# Patient Record
Sex: MALE | Race: OTHER
[De-identification: names, ages, dates, MRNs, and addresses within clinical notes are randomized per-mention and may not be internally consistent; named-entity substitution may affect disease eponyms.]

---

## 2019-03-18 ENCOUNTER — HOSPITAL ENCOUNTER (INPATIENT)
Dept: HOSPITAL 54 - ER | Age: 64
LOS: 4 days | Discharge: NURSING, REHAB OR LONG-TERM CARE | DRG: 137 | End: 2019-03-22
Attending: NURSE PRACTITIONER | Admitting: NURSE PRACTITIONER
Payer: COMMERCIAL

## 2019-03-18 VITALS — BODY MASS INDEX: 32.49 KG/M2 | HEIGHT: 67 IN | WEIGHT: 207 LBS

## 2019-03-18 DIAGNOSIS — R53.2: ICD-10-CM

## 2019-03-18 DIAGNOSIS — N17.9: ICD-10-CM

## 2019-03-18 DIAGNOSIS — E43: ICD-10-CM

## 2019-03-18 DIAGNOSIS — E87.6: ICD-10-CM

## 2019-03-18 DIAGNOSIS — D64.9: ICD-10-CM

## 2019-03-18 DIAGNOSIS — J40: ICD-10-CM

## 2019-03-18 DIAGNOSIS — M48.55XA: ICD-10-CM

## 2019-03-18 DIAGNOSIS — K76.0: ICD-10-CM

## 2019-03-18 DIAGNOSIS — G92: ICD-10-CM

## 2019-03-18 DIAGNOSIS — E78.5: ICD-10-CM

## 2019-03-18 DIAGNOSIS — E11.10: ICD-10-CM

## 2019-03-18 DIAGNOSIS — I50.9: ICD-10-CM

## 2019-03-18 DIAGNOSIS — E83.52: ICD-10-CM

## 2019-03-18 DIAGNOSIS — K56.41: ICD-10-CM

## 2019-03-18 DIAGNOSIS — F03.90: ICD-10-CM

## 2019-03-18 DIAGNOSIS — B37.0: ICD-10-CM

## 2019-03-18 DIAGNOSIS — E11.22: ICD-10-CM
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
## 2019-03-18 DIAGNOSIS — J15.6: Primary | ICD-10-CM

## 2019-03-18 DIAGNOSIS — N18.9: ICD-10-CM

## 2019-03-18 DIAGNOSIS — G40.909: ICD-10-CM

## 2019-03-18 DIAGNOSIS — E87.0: ICD-10-CM

## 2019-03-18 DIAGNOSIS — I69.354: ICD-10-CM

## 2019-03-18 DIAGNOSIS — K80.20: ICD-10-CM

## 2019-03-18 DIAGNOSIS — E87.3: ICD-10-CM

## 2019-03-18 DIAGNOSIS — J15.9: ICD-10-CM

## 2019-03-18 DIAGNOSIS — E83.39: ICD-10-CM

## 2019-03-18 DIAGNOSIS — D68.59: ICD-10-CM

## 2019-03-18 PROCEDURE — C1751 CATH, INF, PER/CENT/MIDLINE: HCPCS

## 2019-03-18 PROCEDURE — G0378 HOSPITAL OBSERVATION PER HR: HCPCS

## 2019-03-18 PROCEDURE — C9113 INJ PANTOPRAZOLE SODIUM, VIA: HCPCS

## 2019-03-18 NOTE — NUR
TO BED 2 [facility] PRIVATE AMBULANCE C/O FEVER AND CONGESTION. PT AWAKE, LETHARGIC, 
RESPONDS TO VOICE BUT FALLS BACK ASLEEP. PT CONFUSED, NO ACUTE DISTRESS NOTED, 
RESP EVEN AND UNLABORED. PLACE PT ON CARDIAC MONITORING, CONTINUOUS POX, 
O2@2L/NC. PENDING ER MD CAMPOVERDE.

## 2019-03-19 VITALS — SYSTOLIC BLOOD PRESSURE: 147 MMHG | DIASTOLIC BLOOD PRESSURE: 81 MMHG

## 2019-03-19 VITALS — DIASTOLIC BLOOD PRESSURE: 75 MMHG | SYSTOLIC BLOOD PRESSURE: 128 MMHG

## 2019-03-19 VITALS — SYSTOLIC BLOOD PRESSURE: 127 MMHG | DIASTOLIC BLOOD PRESSURE: 75 MMHG

## 2019-03-19 VITALS — DIASTOLIC BLOOD PRESSURE: 89 MMHG | SYSTOLIC BLOOD PRESSURE: 129 MMHG

## 2019-03-19 VITALS — DIASTOLIC BLOOD PRESSURE: 80 MMHG | SYSTOLIC BLOOD PRESSURE: 133 MMHG

## 2019-03-19 VITALS — SYSTOLIC BLOOD PRESSURE: 129 MMHG | DIASTOLIC BLOOD PRESSURE: 75 MMHG

## 2019-03-19 VITALS — DIASTOLIC BLOOD PRESSURE: 89 MMHG | SYSTOLIC BLOOD PRESSURE: 149 MMHG

## 2019-03-19 VITALS — SYSTOLIC BLOOD PRESSURE: 131 MMHG | DIASTOLIC BLOOD PRESSURE: 98 MMHG

## 2019-03-19 VITALS — DIASTOLIC BLOOD PRESSURE: 68 MMHG | SYSTOLIC BLOOD PRESSURE: 132 MMHG

## 2019-03-19 VITALS — DIASTOLIC BLOOD PRESSURE: 82 MMHG | SYSTOLIC BLOOD PRESSURE: 157 MMHG

## 2019-03-19 VITALS — DIASTOLIC BLOOD PRESSURE: 84 MMHG | SYSTOLIC BLOOD PRESSURE: 142 MMHG

## 2019-03-19 VITALS — DIASTOLIC BLOOD PRESSURE: 98 MMHG | SYSTOLIC BLOOD PRESSURE: 141 MMHG

## 2019-03-19 VITALS — DIASTOLIC BLOOD PRESSURE: 78 MMHG | SYSTOLIC BLOOD PRESSURE: 132 MMHG

## 2019-03-19 VITALS — DIASTOLIC BLOOD PRESSURE: 82 MMHG | SYSTOLIC BLOOD PRESSURE: 150 MMHG

## 2019-03-19 VITALS — SYSTOLIC BLOOD PRESSURE: 149 MMHG | DIASTOLIC BLOOD PRESSURE: 89 MMHG

## 2019-03-19 VITALS — SYSTOLIC BLOOD PRESSURE: 141 MMHG | DIASTOLIC BLOOD PRESSURE: 75 MMHG

## 2019-03-19 VITALS — SYSTOLIC BLOOD PRESSURE: 140 MMHG | DIASTOLIC BLOOD PRESSURE: 77 MMHG

## 2019-03-19 VITALS — DIASTOLIC BLOOD PRESSURE: 82 MMHG | SYSTOLIC BLOOD PRESSURE: 139 MMHG

## 2019-03-19 VITALS — DIASTOLIC BLOOD PRESSURE: 78 MMHG | SYSTOLIC BLOOD PRESSURE: 145 MMHG

## 2019-03-19 VITALS — DIASTOLIC BLOOD PRESSURE: 76 MMHG | SYSTOLIC BLOOD PRESSURE: 125 MMHG

## 2019-03-19 VITALS — SYSTOLIC BLOOD PRESSURE: 131 MMHG | DIASTOLIC BLOOD PRESSURE: 82 MMHG

## 2019-03-19 VITALS — DIASTOLIC BLOOD PRESSURE: 97 MMHG | SYSTOLIC BLOOD PRESSURE: 140 MMHG

## 2019-03-19 LAB
ALBUMIN SERPL BCP-MCNC: 3 G/DL (ref 3.4–5)
ALBUMIN SERPL BCP-MCNC: 3.7 G/DL (ref 3.4–5)
ALP SERPL-CCNC: 138 U/L (ref 46–116)
ALP SERPL-CCNC: 173 U/L (ref 46–116)
ALT SERPL W P-5'-P-CCNC: 45 U/L (ref 12–78)
ALT SERPL W P-5'-P-CCNC: 56 U/L (ref 12–78)
APPEARANCE UR: (no result)
AST SERPL W P-5'-P-CCNC: 20 U/L (ref 15–37)
AST SERPL W P-5'-P-CCNC: 21 U/L (ref 15–37)
BASOPHILS # BLD AUTO: 0.1 /CMM (ref 0–0.2)
BASOPHILS NFR BLD AUTO: 0.7 % (ref 0–2)
BILIRUB DIRECT SERPL-MCNC: 0.2 MG/DL (ref 0–0.2)
BILIRUB DIRECT SERPL-MCNC: 0.2 MG/DL (ref 0–0.2)
BILIRUB SERPL-MCNC: 0.6 MG/DL (ref 0.2–1)
BILIRUB SERPL-MCNC: 0.7 MG/DL (ref 0.2–1)
BILIRUB UR QL STRIP: (no result)
BILIRUB UR QL STRIP: NEGATIVE
BUN SERPL-MCNC: 18 MG/DL (ref 7–18)
BUN SERPL-MCNC: 22 MG/DL (ref 7–18)
BUN SERPL-MCNC: 35 MG/DL (ref 7–18)
BUN SERPL-MCNC: 40 MG/DL (ref 7–18)
CALCIUM SERPL-MCNC: 10 MG/DL (ref 8.5–10.1)
CALCIUM SERPL-MCNC: 7.6 MG/DL (ref 8.5–10.1)
CALCIUM SERPL-MCNC: 7.9 MG/DL (ref 8.5–10.1)
CALCIUM SERPL-MCNC: 8.5 MG/DL (ref 8.5–10.1)
CHLORIDE SERPL-SCNC: 114 MMOL/L (ref 98–107)
CHLORIDE SERPL-SCNC: 124 MMOL/L (ref 98–107)
CHLORIDE SERPL-SCNC: 127 MMOL/L (ref 98–107)
CHLORIDE SERPL-SCNC: 128 MMOL/L (ref 98–107)
CO2 SERPL-SCNC: 18 MMOL/L (ref 21–32)
CO2 SERPL-SCNC: 20 MMOL/L (ref 21–32)
CO2 SERPL-SCNC: 23 MMOL/L (ref 21–32)
CO2 SERPL-SCNC: 23 MMOL/L (ref 21–32)
COLOR UR: YELLOW
CREAT SERPL-MCNC: 1.5 MG/DL (ref 0.6–1.3)
CREAT SERPL-MCNC: 1.6 MG/DL (ref 0.6–1.3)
CREAT SERPL-MCNC: 2.3 MG/DL (ref 0.6–1.3)
CREAT SERPL-MCNC: 2.4 MG/DL (ref 0.6–1.3)
CREAT UR-MCNC: 74.3 MG/DL (ref 30–125)
EOSINOPHIL NFR BLD AUTO: 0.1 % (ref 0–6)
GLUCOSE SERPL-MCNC: 285 MG/DL (ref 74–106)
GLUCOSE SERPL-MCNC: 291 MG/DL (ref 74–106)
GLUCOSE SERPL-MCNC: 468 MG/DL (ref 74–106)
GLUCOSE SERPL-MCNC: 615 MG/DL (ref 74–106)
GLUCOSE UR STRIP-MCNC: (no result) MG/DL
GLUCOSE UR STRIP-MCNC: NEGATIVE MG/DL
HCT VFR BLD AUTO: 59 % (ref 39–51)
HGB BLD-MCNC: 18.8 G/DL (ref 13.5–17.5)
HGB UR QL STRIP: (no result) ERY/UL
KETONES UR STRIP-MCNC: (no result) MG/DL
KETONES UR STRIP-MCNC: 80 MG/DL
LEUKOCYTE ESTERASE UR QL STRIP: NEGATIVE
LYMPHOCYTES NFR BLD AUTO: 1.6 /CMM (ref 0.8–4.8)
LYMPHOCYTES NFR BLD AUTO: 9.2 % (ref 20–44)
LYMPHOCYTES NFR BLD MANUAL: 7 % (ref 16–48)
MAGNESIUM SERPL-MCNC: 1.7 MG/DL (ref 1.8–2.4)
MAGNESIUM SERPL-MCNC: 2 MG/DL (ref 1.8–2.4)
MAGNESIUM SERPL-MCNC: 2.4 MG/DL (ref 1.8–2.4)
MCHC RBC AUTO-ENTMCNC: 32 G/DL (ref 31–36)
MCV RBC AUTO: 93 FL (ref 80–96)
MONOCYTES NFR BLD AUTO: 1.3 /CMM (ref 0.1–1.3)
MONOCYTES NFR BLD AUTO: 7.6 % (ref 2–12)
MONOCYTES NFR BLD MANUAL: 4 % (ref 0–11)
NEUTROPHILS # BLD AUTO: 14.5 /CMM (ref 1.8–8.9)
NEUTROPHILS NFR BLD AUTO: 82.4 % (ref 43–81)
NEUTS SEG NFR BLD MANUAL: 89 % (ref 42–76)
NITRITE UR QL STRIP: NEGATIVE
NT-PROBNP SERPL-MCNC: 256 PG/ML (ref 0–125)
PH UR STRIP: 5 [PH] (ref 5–8)
PH UR STRIP: 5.5 [PH] (ref 5–8)
PHOSPHATE SERPL-MCNC: 1.6 MG/DL (ref 2.5–4.9)
PHOSPHATE SERPL-MCNC: 1.9 MG/DL (ref 2.5–4.9)
PHOSPHATE SERPL-MCNC: 3.5 MG/DL (ref 2.5–4.9)
PLATELET # BLD AUTO: 248 /CMM (ref 150–450)
POTASSIUM SERPL-SCNC: 3 MMOL/L (ref 3.5–5.1)
POTASSIUM SERPL-SCNC: 3.1 MMOL/L (ref 3.5–5.1)
POTASSIUM SERPL-SCNC: 4 MMOL/L (ref 3.5–5.1)
POTASSIUM SERPL-SCNC: 4.1 MMOL/L (ref 3.5–5.1)
PROT SERPL-MCNC: 7.6 G/DL (ref 6.4–8.2)
PROT SERPL-MCNC: 9.2 G/DL (ref 6.4–8.2)
PROT UR QL STRIP: (no result) MG/DL
PROT UR QL STRIP: 100 MG/DL
PROT UR-MCNC: 52.3 MG/DL (ref 0–11.9)
RBC # BLD AUTO: 6.29 MIL/UL (ref 4.5–6)
RBC #/AREA URNS HPF: (no result) /HPF (ref 0–2)
RBC #/AREA URNS HPF: (no result) /HPF (ref 0–2)
SODIUM SERPL-SCNC: 155 MMOL/L (ref 136–145)
SODIUM SERPL-SCNC: 161 MMOL/L (ref 136–145)
SODIUM SERPL-SCNC: 161 MMOL/L (ref 136–145)
SODIUM SERPL-SCNC: 162 MMOL/L (ref 136–145)
SODIUM UR-SCNC: 42 MMOL/L (ref 40–220)
UROBILINOGEN UR STRIP-MCNC: 0.2 EU/DL
UROBILINOGEN UR STRIP-MCNC: 0.2 EU/DL
WBC #/AREA URNS HPF: (no result) /HPF (ref 0–3)
WBC #/AREA URNS HPF: (no result) /HPF (ref 0–3)
WBC NRBC COR # BLD AUTO: 17.6 K/UL (ref 4.3–11)

## 2019-03-19 PROCEDURE — B548ZZA ULTRASONOGRAPHY OF SUPERIOR VENA CAVA, GUIDANCE: ICD-10-PCS | Performed by: NURSE PRACTITIONER

## 2019-03-19 PROCEDURE — 02HV33Z INSERTION OF INFUSION DEVICE INTO SUPERIOR VENA CAVA, PERCUTANEOUS APPROACH: ICD-10-PCS | Performed by: NURSE PRACTITIONER

## 2019-03-19 RX ADMIN — DOCUSATE SODIUM SCH MG: 250 CAPSULE, LIQUID FILLED ORAL at 09:00

## 2019-03-19 RX ADMIN — Medication SCH EACH: at 13:32

## 2019-03-19 RX ADMIN — Medication SCH EACH: at 21:40

## 2019-03-19 RX ADMIN — LACOSAMIDE SCH MG: 50 TABLET, FILM COATED ORAL at 21:00

## 2019-03-19 RX ADMIN — PIPERACILLIN SODIUM AND TAZOBACTAM SODIUM SCH MLS/HR: .375; 3 INJECTION, POWDER, LYOPHILIZED, FOR SOLUTION INTRAVENOUS at 09:05

## 2019-03-19 RX ADMIN — ASPIRIN 81 MG SCH MG: 81 TABLET ORAL at 12:00

## 2019-03-19 RX ADMIN — GABAPENTIN SCH MG: 300 CAPSULE ORAL at 17:00

## 2019-03-19 RX ADMIN — Medication SCH EACH: at 09:14

## 2019-03-19 RX ADMIN — SODIUM CHLORIDE SCH MLS/HR: 9 INJECTION, SOLUTION INTRAVENOUS at 17:39

## 2019-03-19 RX ADMIN — HEPARIN SODIUM SCH UNITS: 5000 INJECTION INTRAVENOUS; SUBCUTANEOUS at 12:24

## 2019-03-19 RX ADMIN — Medication SCH EACH: at 12:26

## 2019-03-19 RX ADMIN — PIPERACILLIN SODIUM AND TAZOBACTAM SODIUM SCH MLS/HR: .375; 3 INJECTION, POWDER, LYOPHILIZED, FOR SOLUTION INTRAVENOUS at 12:24

## 2019-03-19 RX ADMIN — SODIUM CHLORIDE SCH MLS/HR: 9 INJECTION, SOLUTION INTRAVENOUS at 20:31

## 2019-03-19 RX ADMIN — Medication SCH EACH: at 19:22

## 2019-03-19 RX ADMIN — SODIUM CHLORIDE SCH MG: 9 INJECTION, SOLUTION INTRAVENOUS at 09:00

## 2019-03-19 RX ADMIN — HUMAN INSULIN PRN MLS/HR: 100 INJECTION, SOLUTION SUBCUTANEOUS at 17:52

## 2019-03-19 RX ADMIN — SODIUM CHLORIDE SCH MLS/HR: 9 INJECTION, SOLUTION INTRAVENOUS at 12:44

## 2019-03-19 RX ADMIN — Medication SCH EACH: at 15:08

## 2019-03-19 RX ADMIN — Medication SCH EACH: at 14:26

## 2019-03-19 RX ADMIN — Medication SCH EACH: at 11:19

## 2019-03-19 RX ADMIN — Medication SCH EACH: at 16:14

## 2019-03-19 RX ADMIN — Medication SCH UNIT: at 20:28

## 2019-03-19 RX ADMIN — HUMAN INSULIN PRN MLS/HR: 100 INJECTION, SOLUTION SUBCUTANEOUS at 07:13

## 2019-03-19 RX ADMIN — SODIUM CHLORIDE SCH MLS/HR: 9 INJECTION, SOLUTION INTRAVENOUS at 20:28

## 2019-03-19 RX ADMIN — Medication SCH EACH: at 10:33

## 2019-03-19 RX ADMIN — Medication SCH EACH: at 17:19

## 2019-03-19 RX ADMIN — HEPARIN SODIUM SCH UNITS: 5000 INJECTION INTRAVENOUS; SUBCUTANEOUS at 20:29

## 2019-03-19 RX ADMIN — PIPERACILLIN SODIUM AND TAZOBACTAM SODIUM SCH MLS/HR: .375; 3 INJECTION, POWDER, LYOPHILIZED, FOR SOLUTION INTRAVENOUS at 17:12

## 2019-03-19 RX ADMIN — GABAPENTIN SCH MG: 300 CAPSULE ORAL at 13:00

## 2019-03-19 NOTE — NUR
RN INITIAL NOTES



RECEIVED PT A/OX1, DROWSY. ON 02 AT 2LPM VIA NC. HOB ELEVATED. NO SOB NOTED. NO SIGNS OF 
PAIN NOTED. IV LINES IN PLACE. IVF INFUSING. PT ON INSULIN DRIP, ACCUCHECK Q1. BMP AT 1000. 
SKIN INTACT. BLE ELEVATED. CALL LIGHT WITHIN REACH. WILL CLOSELY MONITOR.

## 2019-03-19 NOTE — NUR
RN CLOSING NOTES



NO SIGNIFICANT CHANGE NOTED. NO CHANGE IN LOC NOTED. PT REMAINS ON INSULIN DRIP AT 5.08 
U/HR. NEXT ACCUCHECK AT 1900. LUPE PICC IN PLACE. IVF INFUSING. FC IN PLACE. KEPT CLEAN AND 
DRY. REPOSITIONED Q2. BLE ELEVATED. ALL NEEDS ATTENDED AND MET. CALL LIGHT WITHIN REACH. 
WILL MONITOR.

## 2019-03-19 NOTE — NUR
RN NOTES



SEEN AND EXAMINED BY DR ALMONTE. REVIEWED H&P, CURRENT MEDS AND LAB VALUES. PT A/OX1, 
CONFUSED. ABLE TO FOLLOW COMMANDS AND ANSER SIMPLE QUESTIONS. NO CHANGE IN LOC NOTED. WILL 
CONTINUE TO MONITOR.

## 2019-03-19 NOTE — NUR
RN NOTES



1125 SEEN AND EXAMINED BY JESSIE RODGERS NP. MD AWARE OF LAB VALUES: WBC 17.6, HGB 18.8, HCT 
59, PLATELET 248. SODIUM 161, BUN 35, CREA 2.3, GLUCOSE 468. PT ON INSULIN DRIP. PT HAS 
LACTIC AND BMP DUE. UNABLE TO DRAW, PT HARD STICK, 2 PHLEBOTOMISTS TRIED 3X. PT FOR PICC 
INSERTION. JESSIE OK TO DO BMP AND LACTIC DRAW ONCE PICC LINE IS INSERTED. AWAITING US KIDNEY 
RESULT. ORDERED NEURO CONSULT WITH DR ALMONTE. IVF CHANGED TO 1/2NS AT 125ML/HR. 



1200 PT NOTED COUGHING TOO MUCH. UNABLE TO GIVE PO MEDS D/T HIGH RISK OF ASPIRATION. PT 
A/OX1, CONFUSED. HOB ELEVATED. PT FOR SWALLOW EVAL. WILL CONTINUE TO MONITOR

## 2019-03-19 NOTE — NUR
RN NOTES



1500 PICC LINE INSERTED ON LUPE. PLACEMENT CONFIRMED BY CXR. CALLED LAB FOR LACTIC AND BMP 
DRAW. WILL NOTIFY REID ROMAN FOR RESULTS

## 2019-03-19 NOTE — NUR
RN ICU - NOTES - ADMISSION - RECEIVED PT FROM ER FOR DKA, PT IS AWAKE ALERT, Burmese 
SPEAKING, PT HAS LEFT SIDED WEAKNESS, 18G IV IN LEFT THUMB, AND 20G IV IN LEFT HAND. PT IS 
ON 2L NC O2SAT 94%. PT IS IN SR W BBB, BP 130S. SKIN IS INTACT. WILL CONTINUE TO MONITOR

## 2019-03-19 NOTE — NUR
RN NOTES



CALLED JESSIE RODGERS NP REGARDING BMP RESULT. SODIUM 162, POTASSIUM 3.1, CHLORIDE 128, BUN 22, 
CREA 1.6, GLUCOSE 285. ON INSULIN DRIP AT 4.9U/HR. ON 1/2NS AT 125ML/HR. PHOS 1.9, MAGNESIUM 
1.7. NP ORDERED CHANGE TO D5 1/2 NS WITH KCL 40MEQ AT 125ML/HR, CHANGE ACCUCHECK TO Q2, 
KPHOS X2 BAGS AND MAGNESIUM 1GM. AWAITING FOR LACTIC ACID RESULT. WILL CONTINUE TO MONITOR.

## 2019-03-20 VITALS — SYSTOLIC BLOOD PRESSURE: 128 MMHG | DIASTOLIC BLOOD PRESSURE: 78 MMHG

## 2019-03-20 VITALS — SYSTOLIC BLOOD PRESSURE: 144 MMHG | DIASTOLIC BLOOD PRESSURE: 75 MMHG

## 2019-03-20 VITALS — SYSTOLIC BLOOD PRESSURE: 119 MMHG | DIASTOLIC BLOOD PRESSURE: 72 MMHG

## 2019-03-20 VITALS — SYSTOLIC BLOOD PRESSURE: 123 MMHG | DIASTOLIC BLOOD PRESSURE: 72 MMHG

## 2019-03-20 VITALS — DIASTOLIC BLOOD PRESSURE: 77 MMHG | SYSTOLIC BLOOD PRESSURE: 129 MMHG

## 2019-03-20 VITALS — SYSTOLIC BLOOD PRESSURE: 128 MMHG | DIASTOLIC BLOOD PRESSURE: 82 MMHG

## 2019-03-20 VITALS — DIASTOLIC BLOOD PRESSURE: 77 MMHG | SYSTOLIC BLOOD PRESSURE: 125 MMHG

## 2019-03-20 VITALS — DIASTOLIC BLOOD PRESSURE: 72 MMHG | SYSTOLIC BLOOD PRESSURE: 120 MMHG

## 2019-03-20 VITALS — SYSTOLIC BLOOD PRESSURE: 122 MMHG | DIASTOLIC BLOOD PRESSURE: 74 MMHG

## 2019-03-20 VITALS — SYSTOLIC BLOOD PRESSURE: 126 MMHG | DIASTOLIC BLOOD PRESSURE: 77 MMHG

## 2019-03-20 VITALS — DIASTOLIC BLOOD PRESSURE: 75 MMHG | SYSTOLIC BLOOD PRESSURE: 127 MMHG

## 2019-03-20 VITALS — DIASTOLIC BLOOD PRESSURE: 73 MMHG | SYSTOLIC BLOOD PRESSURE: 127 MMHG

## 2019-03-20 VITALS — SYSTOLIC BLOOD PRESSURE: 128 MMHG | DIASTOLIC BLOOD PRESSURE: 76 MMHG

## 2019-03-20 VITALS — SYSTOLIC BLOOD PRESSURE: 132 MMHG | DIASTOLIC BLOOD PRESSURE: 81 MMHG

## 2019-03-20 VITALS — DIASTOLIC BLOOD PRESSURE: 78 MMHG | SYSTOLIC BLOOD PRESSURE: 130 MMHG

## 2019-03-20 LAB
ALBUMIN SERPL BCP-MCNC: 2.3 G/DL (ref 3.4–5)
ALP SERPL-CCNC: 103 U/L (ref 46–116)
ALT SERPL W P-5'-P-CCNC: 44 U/L (ref 12–78)
AST SERPL W P-5'-P-CCNC: 28 U/L (ref 15–37)
BASE EXCESS BLDA CALC-SCNC: -4.7 MMOL/L
BASOPHILS # BLD AUTO: 0 /CMM (ref 0–0.2)
BASOPHILS NFR BLD AUTO: 0.4 % (ref 0–2)
BILIRUB SERPL-MCNC: 0.4 MG/DL (ref 0.2–1)
BUN SERPL-MCNC: 14 MG/DL (ref 7–18)
BUN SERPL-MCNC: 15 MG/DL (ref 7–18)
CALCIUM SERPL-MCNC: 7.6 MG/DL (ref 8.5–10.1)
CALCIUM SERPL-MCNC: 8.1 MG/DL (ref 8.5–10.1)
CHLORIDE SERPL-SCNC: 126 MMOL/L (ref 98–107)
CHLORIDE SERPL-SCNC: 127 MMOL/L (ref 98–107)
CO2 SERPL-SCNC: 21 MMOL/L (ref 21–32)
CO2 SERPL-SCNC: 23 MMOL/L (ref 21–32)
CREAT SERPL-MCNC: 1.5 MG/DL (ref 0.6–1.3)
CREAT SERPL-MCNC: 1.6 MG/DL (ref 0.6–1.3)
DO-HGB MFR BLDA: 86.9 MMHG
EOSINOPHIL NFR BLD AUTO: 3.3 % (ref 0–6)
GLUCOSE SERPL-MCNC: 217 MG/DL (ref 74–106)
GLUCOSE SERPL-MCNC: 275 MG/DL (ref 74–106)
HCT VFR BLD AUTO: 52 % (ref 39–51)
HGB BLD-MCNC: 16.5 G/DL (ref 13.5–17.5)
INHALED O2 CONCENTRATION: 28 %
INHALED O2 FLOW RATE: 2 L/MIN (ref 0–30)
LYMPHOCYTES NFR BLD AUTO: 0.5 /CMM (ref 0.8–4.8)
LYMPHOCYTES NFR BLD AUTO: 7.6 % (ref 20–44)
MAGNESIUM SERPL-MCNC: 1.6 MG/DL (ref 1.8–2.4)
MAGNESIUM SERPL-MCNC: 1.9 MG/DL (ref 1.8–2.4)
MCHC RBC AUTO-ENTMCNC: 32 G/DL (ref 31–36)
MCV RBC AUTO: 94 FL (ref 80–96)
MONOCYTES NFR BLD AUTO: 0.5 /CMM (ref 0.1–1.3)
MONOCYTES NFR BLD AUTO: 7.4 % (ref 2–12)
NEUTROPHILS # BLD AUTO: 5.6 /CMM (ref 1.8–8.9)
NEUTROPHILS NFR BLD AUTO: 81.3 % (ref 43–81)
PCO2 TEMP ADJ BLDA: 36 MMHG (ref 35–45)
PH TEMP ADJ BLDA: 7.36 [PH] (ref 7.35–7.45)
PHOSPHATE SERPL-MCNC: 1.7 MG/DL (ref 2.5–4.9)
PHOSPHATE SERPL-MCNC: 2 MG/DL (ref 2.5–4.9)
PLATELET # BLD AUTO: 154 /CMM (ref 150–450)
PO2 TEMP ADJ BLDA: 70.3 MMHG (ref 75–100)
POTASSIUM SERPL-SCNC: 3.1 MMOL/L (ref 3.5–5.1)
POTASSIUM SERPL-SCNC: 3.3 MMOL/L (ref 3.5–5.1)
PROT SERPL-MCNC: 6.2 G/DL (ref 6.4–8.2)
RBC # BLD AUTO: 5.48 MIL/UL (ref 4.5–6)
SAO2 % BLDA: 94.1 % (ref 92–98.5)
SODIUM SERPL-SCNC: 161 MMOL/L (ref 136–145)
SODIUM SERPL-SCNC: 161 MMOL/L (ref 136–145)
WBC NRBC COR # BLD AUTO: 6.9 K/UL (ref 4.3–11)

## 2019-03-20 RX ADMIN — SODIUM CHLORIDE, SODIUM LACTATE, POTASSIUM CHLORIDE, AND CALCIUM CHLORIDE PRN MLS/HR: .6; .31; .03; .02 INJECTION, SOLUTION INTRAVENOUS at 09:12

## 2019-03-20 RX ADMIN — Medication SCH UNIT: at 09:12

## 2019-03-20 RX ADMIN — MAGNESIUM SULFATE IN DEXTROSE SCH MLS/HR: 10 INJECTION, SOLUTION INTRAVENOUS at 09:56

## 2019-03-20 RX ADMIN — SODIUM CHLORIDE SCH MG: 9 INJECTION, SOLUTION INTRAVENOUS at 09:12

## 2019-03-20 RX ADMIN — SODIUM CHLORIDE, SODIUM LACTATE, POTASSIUM CHLORIDE, AND CALCIUM CHLORIDE PRN MLS/HR: .6; .31; .03; .02 INJECTION, SOLUTION INTRAVENOUS at 01:16

## 2019-03-20 RX ADMIN — PIPERACILLIN SODIUM AND TAZOBACTAM SODIUM SCH MLS/HR: .375; 3 INJECTION, POWDER, LYOPHILIZED, FOR SOLUTION INTRAVENOUS at 05:50

## 2019-03-20 RX ADMIN — INSULIN HUMAN PRN UNIT: 100 INJECTION, SOLUTION PARENTERAL at 17:50

## 2019-03-20 RX ADMIN — Medication SCH EACH: at 05:09

## 2019-03-20 RX ADMIN — HEPARIN SODIUM SCH UNITS: 5000 INJECTION INTRAVENOUS; SUBCUTANEOUS at 21:32

## 2019-03-20 RX ADMIN — SODIUM CHLORIDE SCH MLS/HR: 9 INJECTION, SOLUTION INTRAVENOUS at 21:30

## 2019-03-20 RX ADMIN — Medication SCH UNIT: at 13:00

## 2019-03-20 RX ADMIN — Medication SCH EACH: at 23:15

## 2019-03-20 RX ADMIN — GABAPENTIN SCH MG: 300 CAPSULE ORAL at 13:00

## 2019-03-20 RX ADMIN — SODIUM CHLORIDE SCH MLS/HR: 9 INJECTION, SOLUTION INTRAVENOUS at 09:12

## 2019-03-20 RX ADMIN — LACOSAMIDE SCH MG: 50 TABLET, FILM COATED ORAL at 08:59

## 2019-03-20 RX ADMIN — INSULIN HUMAN PRN UNIT: 100 INJECTION, SOLUTION PARENTERAL at 11:42

## 2019-03-20 RX ADMIN — SODIUM CHLORIDE SCH MLS/HR: 9 INJECTION, SOLUTION INTRAVENOUS at 12:01

## 2019-03-20 RX ADMIN — PIPERACILLIN SODIUM AND TAZOBACTAM SODIUM SCH MLS/HR: .375; 3 INJECTION, POWDER, LYOPHILIZED, FOR SOLUTION INTRAVENOUS at 11:42

## 2019-03-20 RX ADMIN — Medication SCH EACH: at 00:03

## 2019-03-20 RX ADMIN — Medication SCH EACH: at 17:38

## 2019-03-20 RX ADMIN — POTASSIUM CHLORIDE SCH MLS/HR: 200 INJECTION, SOLUTION INTRAVENOUS at 04:11

## 2019-03-20 RX ADMIN — Medication SCH EACH: at 11:39

## 2019-03-20 RX ADMIN — ASPIRIN 81 MG SCH MG: 81 TABLET ORAL at 08:59

## 2019-03-20 RX ADMIN — HEPARIN SODIUM SCH UNITS: 5000 INJECTION INTRAVENOUS; SUBCUTANEOUS at 09:14

## 2019-03-20 RX ADMIN — GABAPENTIN SCH MG: 300 CAPSULE ORAL at 08:59

## 2019-03-20 RX ADMIN — SODIUM CHLORIDE SCH MLS/HR: 9 INJECTION, SOLUTION INTRAVENOUS at 10:01

## 2019-03-20 RX ADMIN — Medication SCH EACH: at 07:01

## 2019-03-20 RX ADMIN — PIPERACILLIN SODIUM AND TAZOBACTAM SODIUM SCH MLS/HR: .375; 3 INJECTION, POWDER, LYOPHILIZED, FOR SOLUTION INTRAVENOUS at 17:33

## 2019-03-20 RX ADMIN — PIPERACILLIN SODIUM AND TAZOBACTAM SODIUM SCH MLS/HR: .375; 3 INJECTION, POWDER, LYOPHILIZED, FOR SOLUTION INTRAVENOUS at 00:03

## 2019-03-20 RX ADMIN — POTASSIUM CHLORIDE SCH MLS/HR: 200 INJECTION, SOLUTION INTRAVENOUS at 05:23

## 2019-03-20 RX ADMIN — SODIUM CHLORIDE PRN MLS/HR: 4.5 INJECTION, SOLUTION INTRAVENOUS at 09:48

## 2019-03-20 RX ADMIN — PIPERACILLIN SODIUM AND TAZOBACTAM SODIUM SCH MLS/HR: .375; 3 INJECTION, POWDER, LYOPHILIZED, FOR SOLUTION INTRAVENOUS at 23:20

## 2019-03-20 RX ADMIN — POTASSIUM CHLORIDE SCH MLS/HR: 200 INJECTION, SOLUTION INTRAVENOUS at 02:58

## 2019-03-20 RX ADMIN — INSULIN HUMAN PRN UNIT: 100 INJECTION, SOLUTION PARENTERAL at 23:17

## 2019-03-20 RX ADMIN — GABAPENTIN SCH MG: 300 CAPSULE ORAL at 17:29

## 2019-03-20 RX ADMIN — DOCUSATE SODIUM SCH MG: 250 CAPSULE, LIQUID FILLED ORAL at 08:59

## 2019-03-20 RX ADMIN — Medication SCH EACH: at 02:58

## 2019-03-20 RX ADMIN — POTASSIUM CHLORIDE SCH MLS/HR: 200 INJECTION, SOLUTION INTRAVENOUS at 06:20

## 2019-03-20 RX ADMIN — MAGNESIUM SULFATE IN DEXTROSE SCH MLS/HR: 10 INJECTION, SOLUTION INTRAVENOUS at 11:05

## 2019-03-20 RX ADMIN — DEXTROSE MONOHYDRATE SCH MLS/HR: 50 INJECTION, SOLUTION INTRAVENOUS at 06:21

## 2019-03-20 RX ADMIN — SODIUM CHLORIDE, SODIUM LACTATE, POTASSIUM CHLORIDE, AND CALCIUM CHLORIDE PRN MLS/HR: .6; .31; .03; .02 INJECTION, SOLUTION INTRAVENOUS at 01:15

## 2019-03-20 RX ADMIN — INSULIN GLARGINE SCH UNIT: 100 INJECTION, SOLUTION SUBCUTANEOUS at 21:33

## 2019-03-20 RX ADMIN — Medication SCH UNIT: at 17:29

## 2019-03-20 RX ADMIN — Medication SCH EACH: at 00:58

## 2019-03-20 RX ADMIN — LACOSAMIDE SCH MG: 50 TABLET, FILM COATED ORAL at 21:30

## 2019-03-20 NOTE — NUR
RN INITIAL NOTES



RECEIVED THE PATIENT SLEEPING ON BED. EASILY AROUSABLE, PT IS A/O X1 ONLY. ON 2L NASAL 
CANNULA, SATURATING WELL, NO S/S OF RESP DISTRESS. PT IS SR ON THE MONITOR, HR 70-80'S. LEFT 
NARE NGT IS CLAMPED, PLACEMENT VERIFIED VIA AUSCULTATION. BEE CATH INTACT. RIGHT UPPER ARM 
PICC WITH 1/2NS @ 75MLS/HR, FLUSHED AND PATENT, NO S/S OF INFILTRATION/INFECTION, DRESSING 
CDI. RIGHT ARM WRIST RESTRAINT IN PLACE FOR SAFETY. BED LOW AND LOCKED, SIDERAILS UP, CALL 
LIGHT WITHIN REACH. WILL MONITOR

## 2019-03-20 NOTE — NUR
RN NOTE

1200 RECEIVED PT FROM REBEKA, ICU RN, IN STABLE CONDITION. WILL CARRY OUT ORDERS, PENDING 
DIETARY CONSULT. ON NC 2 L/MIN, SATURATION 97%, NO SOB. R WRIST RESTRAINT IN PLACE, PT 
ATTEMPTS TO PULL OUT NG TUBE. NG TUBE IN LEFT NARIS SECURED AND IN PLACE, CHECKED FOR 
PLACEMENT. NO TUBE FEEDING YET, PENDING VIDEO SWALLOW TOMORROW. BEE IN PLACE DRAINING 
URINE, PICC LINE INTACT AND PATENT. SAFETY MEASURES IN PLACE, CALL LIGHT WITHIN REACH, WILL 
CONTINUE TO MONITOR.

## 2019-03-20 NOTE — NUR
Pt seen & examined by REID Celestin & Dr. Briggs w/ orders made & carried out. Per NP, once NGT in 
place may place restraint on R arm d/t pulling out of tubes. May order CXR STAT to verify 
NGT placement. 

-------------------------------------------------------------------------------

Addendum: 03/20/19 at 1237 by REBEKA ALBERTS RN

-------------------------------------------------------------------------------

Addendum: Per bela Celestin start GTF c/o RD.

## 2019-03-20 NOTE — NUR
BMP relayed to Fawad MATHEWS. Rec'd call back & ordered to stop insulin drip & start on moderate 
sliding scale q6H, check HGBA1c STAT.

## 2019-03-20 NOTE — NUR
REMAINS CONFUSED,FOLLOWING DKA PROTOCOL AS ORDERED, NO ACUTE DISTRESS NOTED OR 
VOICED.REMAINS IN NSR

## 2019-03-20 NOTE — NUR
ICU RN TRANSFER NOTES:



Pt transferred to [facility] 111/2 via bed, ACLS protocol as ordered. Pt not in any distress. No 
significant changes noted w/in shift & upon transfer. VS stable. LUPE PICC line TLC kept 
patent & intact as well as NGT & FC. All belongings including meds sent w/ pt. Bedside 
report given to LISSETTE Cisneros for TONA.

## 2019-03-21 VITALS — DIASTOLIC BLOOD PRESSURE: 78 MMHG | SYSTOLIC BLOOD PRESSURE: 122 MMHG

## 2019-03-21 VITALS — DIASTOLIC BLOOD PRESSURE: 83 MMHG | SYSTOLIC BLOOD PRESSURE: 149 MMHG

## 2019-03-21 VITALS — DIASTOLIC BLOOD PRESSURE: 68 MMHG | SYSTOLIC BLOOD PRESSURE: 108 MMHG

## 2019-03-21 VITALS — SYSTOLIC BLOOD PRESSURE: 119 MMHG | DIASTOLIC BLOOD PRESSURE: 78 MMHG

## 2019-03-21 VITALS — SYSTOLIC BLOOD PRESSURE: 98 MMHG | DIASTOLIC BLOOD PRESSURE: 65 MMHG

## 2019-03-21 VITALS — DIASTOLIC BLOOD PRESSURE: 70 MMHG | SYSTOLIC BLOOD PRESSURE: 115 MMHG

## 2019-03-21 LAB
BASOPHILS # BLD AUTO: 0 /CMM (ref 0–0.2)
BASOPHILS NFR BLD AUTO: 0.7 % (ref 0–2)
BUN SERPL-MCNC: 12 MG/DL (ref 7–18)
CALCIUM SERPL-MCNC: 7.5 MG/DL (ref 8.5–10.1)
CHLORIDE SERPL-SCNC: 120 MMOL/L (ref 98–107)
CO2 SERPL-SCNC: 25 MMOL/L (ref 21–32)
CREAT SERPL-MCNC: 1.3 MG/DL (ref 0.6–1.3)
EOSINOPHIL NFR BLD AUTO: 4.9 % (ref 0–6)
GLUCOSE SERPL-MCNC: 197 MG/DL (ref 74–106)
HCT VFR BLD AUTO: 43 % (ref 39–51)
HGB BLD-MCNC: 14.1 G/DL (ref 13.5–17.5)
LYMPHOCYTES NFR BLD AUTO: 0.7 /CMM (ref 0.8–4.8)
LYMPHOCYTES NFR BLD AUTO: 14.3 % (ref 20–44)
MCHC RBC AUTO-ENTMCNC: 33 G/DL (ref 31–36)
MCV RBC AUTO: 91 FL (ref 80–96)
MONOCYTES NFR BLD AUTO: 0.4 /CMM (ref 0.1–1.3)
MONOCYTES NFR BLD AUTO: 8.5 % (ref 2–12)
NEUTROPHILS # BLD AUTO: 3.3 /CMM (ref 1.8–8.9)
NEUTROPHILS NFR BLD AUTO: 71.6 % (ref 43–81)
PLATELET # BLD AUTO: 111 /CMM (ref 150–450)
POTASSIUM SERPL-SCNC: 3.5 MMOL/L (ref 3.5–5.1)
RBC # BLD AUTO: 4.72 MIL/UL (ref 4.5–6)
SODIUM SERPL-SCNC: 154 MMOL/L (ref 136–145)
WBC NRBC COR # BLD AUTO: 4.6 K/UL (ref 4.3–11)

## 2019-03-21 RX ADMIN — PIPERACILLIN SODIUM AND TAZOBACTAM SODIUM SCH MLS/HR: .375; 3 INJECTION, POWDER, LYOPHILIZED, FOR SOLUTION INTRAVENOUS at 11:53

## 2019-03-21 RX ADMIN — Medication SCH UNIT: at 09:00

## 2019-03-21 RX ADMIN — GABAPENTIN SCH MG: 300 CAPSULE ORAL at 09:00

## 2019-03-21 RX ADMIN — SODIUM CHLORIDE PRN MLS/HR: 4.5 INJECTION, SOLUTION INTRAVENOUS at 00:27

## 2019-03-21 RX ADMIN — Medication SCH UNIT: at 12:14

## 2019-03-21 RX ADMIN — ASPIRIN 81 MG SCH MG: 81 TABLET ORAL at 08:50

## 2019-03-21 RX ADMIN — GABAPENTIN SCH MG: 300 CAPSULE ORAL at 12:15

## 2019-03-21 RX ADMIN — PIPERACILLIN SODIUM AND TAZOBACTAM SODIUM SCH MLS/HR: .375; 3 INJECTION, POWDER, LYOPHILIZED, FOR SOLUTION INTRAVENOUS at 16:59

## 2019-03-21 RX ADMIN — Medication SCH EACH: at 16:19

## 2019-03-21 RX ADMIN — Medication SCH EACH: at 17:31

## 2019-03-21 RX ADMIN — PIPERACILLIN SODIUM AND TAZOBACTAM SODIUM SCH MLS/HR: .375; 3 INJECTION, POWDER, LYOPHILIZED, FOR SOLUTION INTRAVENOUS at 05:19

## 2019-03-21 RX ADMIN — LACOSAMIDE SCH MG: 50 TABLET, FILM COATED ORAL at 20:40

## 2019-03-21 RX ADMIN — HEPARIN SODIUM SCH UNITS: 5000 INJECTION INTRAVENOUS; SUBCUTANEOUS at 20:41

## 2019-03-21 RX ADMIN — DEXTROSE MONOHYDRATE SCH MLS/HR: 50 INJECTION, SOLUTION INTRAVENOUS at 06:06

## 2019-03-21 RX ADMIN — GABAPENTIN SCH MG: 300 CAPSULE ORAL at 08:51

## 2019-03-21 RX ADMIN — Medication SCH EACH: at 23:21

## 2019-03-21 RX ADMIN — HEPARIN SODIUM SCH UNITS: 5000 INJECTION INTRAVENOUS; SUBCUTANEOUS at 08:52

## 2019-03-21 RX ADMIN — Medication SCH UNIT: at 16:19

## 2019-03-21 RX ADMIN — Medication SCH EACH: at 05:19

## 2019-03-21 RX ADMIN — DOCUSATE SODIUM SCH MG: 50 LIQUID ORAL at 09:14

## 2019-03-21 RX ADMIN — PIPERACILLIN SODIUM AND TAZOBACTAM SODIUM SCH MLS/HR: .375; 3 INJECTION, POWDER, LYOPHILIZED, FOR SOLUTION INTRAVENOUS at 23:22

## 2019-03-21 RX ADMIN — GABAPENTIN SCH MG: 300 CAPSULE ORAL at 16:19

## 2019-03-21 RX ADMIN — Medication SCH UNIT: at 08:51

## 2019-03-21 RX ADMIN — LACOSAMIDE SCH MG: 50 TABLET, FILM COATED ORAL at 08:51

## 2019-03-21 RX ADMIN — LEVETIRACETAM SCH MG: 100 SOLUTION ORAL at 20:40

## 2019-03-21 RX ADMIN — INSULIN GLARGINE SCH UNIT: 100 INJECTION, SOLUTION SUBCUTANEOUS at 22:28

## 2019-03-21 RX ADMIN — PANTOPRAZOLE SODIUM SCH MG: 40 GRANULE, DELAYED RELEASE ORAL at 09:14

## 2019-03-21 RX ADMIN — Medication SCH EACH: at 11:47

## 2019-03-21 RX ADMIN — INSULIN HUMAN PRN UNIT: 100 INJECTION, SOLUTION PARENTERAL at 11:52

## 2019-03-21 RX ADMIN — INSULIN HUMAN PRN UNIT: 100 INJECTION, SOLUTION PARENTERAL at 17:45

## 2019-03-21 RX ADMIN — LACOSAMIDE SCH MG: 50 TABLET, FILM COATED ORAL at 09:00

## 2019-03-21 RX ADMIN — INSULIN HUMAN PRN UNIT: 100 INJECTION, SOLUTION PARENTERAL at 22:30

## 2019-03-21 RX ADMIN — ASPIRIN 81 MG SCH MG: 81 TABLET ORAL at 09:00

## 2019-03-21 NOTE — NUR
RN [facility] OPENING NOTES



RECEIVED PATIENT ASLEEP IN BED. ABLE TO AROUSE WITH TOUCH AND VOICE A/O 1-2 ON 2LTRS NASAL 
CANNULA SATURATING WELL. NO SIGNS OR   SYMPTOMS OF RESPIRATORY DISTRESS OR ACUTE PAIN NOTED. 
RIGHT HAND SOFT WRIST RESTRAINT FOR PULLING AT TUBES. SINUS RYTHMN ON THE MONITOR. BEE 
CATH DRAINING CLEAR YELLOW URINE.LUPE PICC RUNNING WITH 1/2 NS @ 75ML/HR . LABS DRAWN WILL 
MONITOR SAFETY PRECAUTIONS IN PLACE BED IN LOW POSITION CALL LIGHT WITHIN REACH

## 2019-03-21 NOTE — NUR
RN CLOSING NOTES



PT REMAINS STABLE AS OF THE MOMENT. ALL DUE MEDS GIVEN, AM CARE PROVIDED. WILL ENDORSE TONA 
TO AM RN

## 2019-03-21 NOTE — NUR
RN TELE CLOSING NOTES



ENDORSED TO NOC . PATIENT PASSED VIDEO SWALLOW AND EATING THICKEN LIQUIDS WITH PUREE. NGT IN 
PLACE AND CLAMPED FOR MED AND H2O FLUSH. SAFETY AND ASPIRATION PRECAUTION IN PLACE. 
BILATERAL SOFR WRIST RESTRAINT TO RIGHT HAND FOR PULLING AT TUBES. ALL NEEDS MET. TONA

## 2019-03-21 NOTE — NUR
tele rn notes 

received  pts in bed a/o x1 on tele  sr and bbb on the monitor , no sob no distress noted 
,v/s stable afebrile  on 2liters of 02 via nc ,sating  100%due meds given  as ordered  call 
light within reach kept pts clean dry and comfortable, pts on ngt  intact and patent clamped 
 pts meds given via ngt and flush ngt with 150 cc of water q4hrs ,pts hob elevated at all 
times ,turned and reposition q 2hrs and  prn all needs attended too .will continue to 
monitor pts

## 2019-03-22 VITALS — SYSTOLIC BLOOD PRESSURE: 122 MMHG | DIASTOLIC BLOOD PRESSURE: 71 MMHG

## 2019-03-22 VITALS — SYSTOLIC BLOOD PRESSURE: 119 MMHG | DIASTOLIC BLOOD PRESSURE: 77 MMHG

## 2019-03-22 VITALS — SYSTOLIC BLOOD PRESSURE: 132 MMHG | DIASTOLIC BLOOD PRESSURE: 77 MMHG

## 2019-03-22 VITALS — DIASTOLIC BLOOD PRESSURE: 69 MMHG | SYSTOLIC BLOOD PRESSURE: 105 MMHG

## 2019-03-22 VITALS — SYSTOLIC BLOOD PRESSURE: 116 MMHG | DIASTOLIC BLOOD PRESSURE: 71 MMHG

## 2019-03-22 LAB
ALBUMIN SERPL BCP-MCNC: 1.8 G/DL (ref 3.4–5)
ALP SERPL-CCNC: 80 U/L (ref 46–116)
ALT SERPL W P-5'-P-CCNC: 25 U/L (ref 12–78)
AST SERPL W P-5'-P-CCNC: 21 U/L (ref 15–37)
BASOPHILS # BLD AUTO: 0 /CMM (ref 0–0.2)
BASOPHILS NFR BLD AUTO: 0.7 % (ref 0–2)
BILIRUB SERPL-MCNC: 0.6 MG/DL (ref 0.2–1)
BUN SERPL-MCNC: 9 MG/DL (ref 7–18)
CALCIUM SERPL-MCNC: 6.8 MG/DL (ref 8.5–10.1)
CHLORIDE SERPL-SCNC: 109 MMOL/L (ref 98–107)
CO2 SERPL-SCNC: 23 MMOL/L (ref 21–32)
CREAT SERPL-MCNC: 1.1 MG/DL (ref 0.6–1.3)
EOSINOPHIL NFR BLD AUTO: 4.7 % (ref 0–6)
EOSINOPHIL NFR BLD MANUAL: 2 % (ref 0–4)
GLUCOSE SERPL-MCNC: 165 MG/DL (ref 74–106)
HCT VFR BLD AUTO: 37 % (ref 39–51)
HGB BLD-MCNC: 12.4 G/DL (ref 13.5–17.5)
LYMPHOCYTES NFR BLD AUTO: 0.6 /CMM (ref 0.8–4.8)
LYMPHOCYTES NFR BLD AUTO: 15.1 % (ref 20–44)
LYMPHOCYTES NFR BLD MANUAL: 18 % (ref 16–48)
MAGNESIUM SERPL-MCNC: 1.7 MG/DL (ref 1.8–2.4)
MCHC RBC AUTO-ENTMCNC: 33 G/DL (ref 31–36)
MCV RBC AUTO: 90 FL (ref 80–96)
MONOCYTES NFR BLD AUTO: 0.4 /CMM (ref 0.1–1.3)
MONOCYTES NFR BLD AUTO: 9.9 % (ref 2–12)
MONOCYTES NFR BLD MANUAL: 7 % (ref 0–11)
NEUTROPHILS # BLD AUTO: 2.7 /CMM (ref 1.8–8.9)
NEUTROPHILS NFR BLD AUTO: 69.6 % (ref 43–81)
NEUTS SEG NFR BLD MANUAL: 73 % (ref 42–76)
PHOSPHATE SERPL-MCNC: 2 MG/DL (ref 2.5–4.9)
PLATELET # BLD AUTO: 93 /CMM (ref 150–450)
POTASSIUM SERPL-SCNC: 2.7 MMOL/L (ref 3.5–5.1)
PROT SERPL-MCNC: 5.2 G/DL (ref 6.4–8.2)
RBC # BLD AUTO: 4.14 MIL/UL (ref 4.5–6)
SODIUM SERPL-SCNC: 142 MMOL/L (ref 136–145)
WBC NRBC COR # BLD AUTO: 3.9 K/UL (ref 4.3–11)

## 2019-03-22 RX ADMIN — INSULIN HUMAN PRN UNIT: 100 INJECTION, SOLUTION PARENTERAL at 05:16

## 2019-03-22 RX ADMIN — Medication SCH EACH: at 09:16

## 2019-03-22 RX ADMIN — PIPERACILLIN SODIUM AND TAZOBACTAM SODIUM SCH MLS/HR: .375; 3 INJECTION, POWDER, LYOPHILIZED, FOR SOLUTION INTRAVENOUS at 12:52

## 2019-03-22 RX ADMIN — Medication SCH EACH: at 12:27

## 2019-03-22 RX ADMIN — INSULIN HUMAN PRN UNIT: 100 INJECTION, SOLUTION PARENTERAL at 12:28

## 2019-03-22 RX ADMIN — SODIUM CHLORIDE PRN MLS/HR: 4.5 INJECTION, SOLUTION INTRAVENOUS at 00:56

## 2019-03-22 RX ADMIN — PIPERACILLIN SODIUM AND TAZOBACTAM SODIUM SCH MLS/HR: .375; 3 INJECTION, POWDER, LYOPHILIZED, FOR SOLUTION INTRAVENOUS at 05:09

## 2019-03-22 RX ADMIN — PANTOPRAZOLE SODIUM SCH MG: 40 GRANULE, DELAYED RELEASE ORAL at 09:16

## 2019-03-22 RX ADMIN — HEPARIN SODIUM SCH UNITS: 5000 INJECTION INTRAVENOUS; SUBCUTANEOUS at 09:00

## 2019-03-22 RX ADMIN — Medication SCH EACH: at 05:16

## 2019-03-22 RX ADMIN — DOCUSATE SODIUM SCH MG: 50 LIQUID ORAL at 09:15

## 2019-03-22 RX ADMIN — Medication SCH UNIT: at 13:37

## 2019-03-22 RX ADMIN — GABAPENTIN SCH MG: 300 CAPSULE ORAL at 09:16

## 2019-03-22 RX ADMIN — GABAPENTIN SCH MG: 300 CAPSULE ORAL at 13:37

## 2019-03-22 RX ADMIN — Medication SCH UNIT: at 09:16

## 2019-03-22 RX ADMIN — ASPIRIN 81 MG SCH MG: 81 TABLET ORAL at 09:14

## 2019-03-22 RX ADMIN — LACOSAMIDE SCH MG: 50 TABLET, FILM COATED ORAL at 09:16

## 2019-03-22 RX ADMIN — LEVETIRACETAM SCH MG: 100 SOLUTION ORAL at 09:15

## 2019-03-22 NOTE — NUR
RN NOTES



RECEIVED PATIENT ASLEEP IN BED. ABLE TO AROUSE TO VOICE A/O 1-2. NO SOB NOTED AT THIS TIME. 
ON 2LPM VIA NASAL CANNULA. SATURATING WELL. NO COMPLAINTS OF PAIN OR ANY DISCOMFORT AT THIS 
TIME. PATIENT SINUS RHYTHM ON THE MONITOR, HR AT 70'S. LUPE PICC RUNNING WITH 1/2 NS @ 
75ML/HR . RIGHT HAND SOFT WRIST RESTRAINT  IN PLACE DUE TO PULLING AT TUBES: PULSES 
PALPABLE,  ABLE TO WIGGLE HAND, NO DISCOLORATION OR SKIN BREAKDOWN ON THE SITE. RESTRAINTS 
REMOVE AND REPLACED.  BEE CATH DRAINING VIA GRAVITY TO CLEAR YELLOW URINE. HOB KEPT 
ELEVATED. SAFETY PRECAUTIONS OBSERVED AND KEPT IN PLACE. BED IN LOW POSITION  ENCOURAGE TO 
CALL FOR ASSISTANCE OR HELP. CALL LIGHT WITHIN REACH. WILL CONTINUE TO MONITOR PATIENT

## 2019-03-22 NOTE — NUR
RN NOTES



CALLED [facility], REPORT  AND REPORT FOR CONTINUITY ODF CARE GIVEN TO LISSETTE CASTRO. INFORMED MATTHEW THAT PICC LINE WILL NOT BE REMOVED SINCE PATIENT WILL BE GETTING ZOSYN Q8H 
FOR 8 DAYS MORE.

## 2022-04-26 NOTE — NUR
RN NOTES



PATIENT DISCHARGE. ALL DISCHARGE AND MEDICATION INSTRUCTION GIVEN TO LISSETTE CASTRO  ON 
ENDORSEMENT. NO BELONGINGS ACCOUNTED. SKIN IS INTACT ON DISCHARGE. PRESCRIPTION  ATTACHED TO 
THE PACKET WHICH WAS HANDED OVER TO THE ACCOMPANYING EMT. ID BAND REMOVED. LUPE PICC LINE 
LEFT IN PLACE DUE TO FURTHER ANTIBIOTIC THERAPY. 30-Apr-2022 14:34